# Patient Record
Sex: FEMALE | Race: BLACK OR AFRICAN AMERICAN | Employment: UNEMPLOYED | URBAN - METROPOLITAN AREA
[De-identification: names, ages, dates, MRNs, and addresses within clinical notes are randomized per-mention and may not be internally consistent; named-entity substitution may affect disease eponyms.]

---
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
## 2021-08-18 NOTE — ED PROVIDER NOTES
Patient Seen in: [facility] Emergency Department    History   Patient presents with:  Headache  Sore Throat      HPI    Patient presents to the ED complaining of headache and sore throat for 2 days. No fever.   Symptoms moderate in severity and co Head: Normocephalic and atraumatic. Right Ear: Tympanic membrane normal.      Left Ear: Tympanic membrane normal.      Nose: No congestion. Mouth/Throat:      Mouth: Mucous membranes are moist. No oral lesions.       Pharynx: Posterior oropharynge URI    Medical Record Review: I personally reviewed available prior medical records for any recent pertinent discharge summaries, testing, and procedures and reviewed those reports. Complicating Factors:  The patient already has does not have a problem l

## 2024-06-12 ENCOUNTER — NURSE ONLY (OUTPATIENT)
Dept: INTERNAL MEDICINE CLINIC | Facility: HOSPITAL | Age: 34
End: 2024-06-12
Attending: PREVENTIVE MEDICINE

## 2024-06-12 DIAGNOSIS — Z00.00 WELLNESS EXAMINATION: Primary | ICD-10-CM

## 2024-06-12 PROCEDURE — 86480 TB TEST CELL IMMUN MEASURE: CPT

## 2024-06-12 PROCEDURE — 86735 MUMPS ANTIBODY: CPT

## 2024-06-12 PROCEDURE — 86765 RUBEOLA ANTIBODY: CPT

## 2024-06-12 PROCEDURE — 86787 VARICELLA-ZOSTER ANTIBODY: CPT

## 2024-06-13 LAB
M TB IFN-G CD4+ T-CELLS BLD-ACNC: 0.06 IU/ML
M TB TUBERC IFN-G BLD QL: NEGATIVE
M TB TUBERC IGNF/MITOGEN IGNF CONTROL: >10 IU/ML
QFT TB1 AG MINUS NIL: 0.03 IU/ML
QFT TB2 AG MINUS NIL: 0.05 IU/ML

## 2024-06-14 LAB
MEV IGG SER-ACNC: 236 AU/ML (ref 16.5–?)
MUV IGG SER IA-ACNC: 82.8 AU/ML (ref 11–?)
VZV IGG SER IA-ACNC: 967.5 (ref 165–?)

## 2025-06-09 ENCOUNTER — HOSPITAL ENCOUNTER (EMERGENCY)
Facility: HOSPITAL | Age: 35
Discharge: HOME | End: 2025-06-09
Attending: EMERGENCY MEDICINE
Payer: MEDICAID

## 2025-06-09 ENCOUNTER — APPOINTMENT (OUTPATIENT)
Dept: ULTRASOUND IMAGING | Facility: HOSPITAL | Age: 35
End: 2025-06-09
Attending: EMERGENCY MEDICINE
Payer: MEDICAID

## 2025-06-09 VITALS
SYSTOLIC BLOOD PRESSURE: 115 MMHG | HEART RATE: 80 BPM | RESPIRATION RATE: 18 BRPM | WEIGHT: 180 LBS | DIASTOLIC BLOOD PRESSURE: 64 MMHG | BODY MASS INDEX: 31 KG/M2 | OXYGEN SATURATION: 97 % | TEMPERATURE: 98 F

## 2025-06-09 DIAGNOSIS — O20.0 THREATENED MISCARRIAGE (HCC): Primary | ICD-10-CM

## 2025-06-09 LAB
B-HCG SERPL-ACNC: 38.5 MIU/ML (ref ?–4.2)
BASOPHILS # BLD AUTO: 0.01 X10(3) UL (ref 0–0.2)
BASOPHILS NFR BLD AUTO: 0.2 %
DEPRECATED RDW RBC AUTO: 39.5 FL (ref 35.1–46.3)
EOSINOPHIL # BLD AUTO: 0.03 X10(3) UL (ref 0–0.7)
EOSINOPHIL NFR BLD AUTO: 0.7 %
ERYTHROCYTE [DISTWIDTH] IN BLOOD BY AUTOMATED COUNT: 12.3 % (ref 11–15)
HCT VFR BLD AUTO: 35.6 % (ref 35–48)
HGB BLD-MCNC: 12.2 G/DL (ref 12–16)
IMM GRANULOCYTES # BLD AUTO: 0.01 X10(3) UL (ref 0–1)
IMM GRANULOCYTES NFR BLD: 0.2 %
LYMPHOCYTES # BLD AUTO: 2.18 X10(3) UL (ref 1–4)
LYMPHOCYTES NFR BLD AUTO: 47.8 %
MCH RBC QN AUTO: 29.9 PG (ref 26–34)
MCHC RBC AUTO-ENTMCNC: 34.3 G/DL (ref 31–37)
MCV RBC AUTO: 87.3 FL (ref 80–100)
MONOCYTES # BLD AUTO: 0.36 X10(3) UL (ref 0.1–1)
MONOCYTES NFR BLD AUTO: 7.9 %
NEUTROPHILS # BLD AUTO: 1.97 X10 (3) UL (ref 1.5–7.7)
NEUTROPHILS # BLD AUTO: 1.97 X10(3) UL (ref 1.5–7.7)
NEUTROPHILS NFR BLD AUTO: 43.2 %
PLATELET # BLD AUTO: 352 10(3)UL (ref 150–450)
RBC # BLD AUTO: 4.08 X10(6)UL (ref 3.8–5.3)
RH BLOOD TYPE: POSITIVE
WBC # BLD AUTO: 4.6 X10(3) UL (ref 4–11)

## 2025-06-09 PROCEDURE — 84702 CHORIONIC GONADOTROPIN TEST: CPT | Performed by: EMERGENCY MEDICINE

## 2025-06-09 PROCEDURE — 76801 OB US < 14 WKS SINGLE FETUS: CPT | Performed by: EMERGENCY MEDICINE

## 2025-06-09 PROCEDURE — 85025 COMPLETE CBC W/AUTO DIFF WBC: CPT | Performed by: EMERGENCY MEDICINE

## 2025-06-09 PROCEDURE — 99284 EMERGENCY DEPT VISIT MOD MDM: CPT

## 2025-06-09 PROCEDURE — 86900 BLOOD TYPING SEROLOGIC ABO: CPT | Performed by: EMERGENCY MEDICINE

## 2025-06-09 PROCEDURE — 86901 BLOOD TYPING SEROLOGIC RH(D): CPT | Performed by: EMERGENCY MEDICINE

## 2025-06-09 PROCEDURE — 36415 COLL VENOUS BLD VENIPUNCTURE: CPT

## 2025-06-09 PROCEDURE — 76817 TRANSVAGINAL US OBSTETRIC: CPT | Performed by: EMERGENCY MEDICINE

## 2025-06-09 NOTE — ED QUICK NOTES
US results back. Pt states pain from US testing. 5/10. Pad provided, warm blankets provided. Awaiting provider to room to discuss results. Pt remains stable with partner to room. Care ongoing,,

## 2025-06-09 NOTE — ED INITIAL ASSESSMENT (HPI)
Patient had a positive preganancy test on Friday, spotting and mild cramping noted on Saturday, increasing bleeding and worsening cramping today.

## 2025-06-10 NOTE — ED PROVIDER NOTES
Patient Seen in: [facility] Emergency Department    History     Chief Complaint   Patient presents with    Pregnancy Issues     Stated Complaint: 1st trimester bleed    HPI    Patient is  ? Few weeks pregnant complains of vaginal bleeding that began 2 days ago  Patient states that this is similar to  amount of bleeding when compared to her typical menstrual bleeding.  Patient denies localizing abdominal pain.  not dizzy,not light headed, no shortness of breath.  no fever, or chills, no urinary frequency or urgency.  + nausea or vomiting.   no care prior to today.     Past Medical History[1]    Past Surgical History[2]         Family History[3]    Short Social Hx on File[4]    Review of Systems    Positive for stated complaint: 1st trimester bleed  Other systems are as noted in HPI.  Constitutional and vital signs reviewed.      All other systems reviewed and negative except as noted above.    PSFH elements reviewed from today and agreed except as otherwise stated in HPI.    Physical Exam     ED Triage Vitals [06/09/25 1415]   /82   Pulse 71   Resp 18   Temp 97.6 °F (36.4 °C)   Temp src Temporal   SpO2 99 %   O2 Device None (Room air)       Current:/64   Pulse 80   Temp 98.4 °F (36.9 °C) (Oral)   Resp 18   Wt 81.6 kg   LMP 04/28/2025 (Approximate)   SpO2 97%   BMI 30.90 kg/m²     PULSE OX nl  GENERAL awake alert   HEAD: normocephalic, atraumatic  EYES: PERRLA, EOMI,  THROAT: mm dry, no lesions  NECK: supple, no meningeal signs  LUNGS: no resp distress, cta bilateral  CARDIO: RRR without murmur  GI: soft mild suprapubic tenderness    EXTREMITIES: moves all 4 spont, no edema  NEURO: alert, oriented x 3, 2-12 intact, no focal deficits appreciated  SKIN: good skin turgor, no  rashes  PSYCH: calm, cooperative,    Differential includes:  ectopic pregnancy vs. subchorionic hemorrhage vs. miscariage       ED Course     Labs Reviewed   HCG, BETA SUBUNIT (QUANT PREGNANCY TEST) - Abnormal; Notable  for the following components:       Result Value    Hcg Quantitative 38.5 (*)     All other components within normal limits   CBC WITH DIFFERENTIAL WITH PLATELET   ABORH (BLOOD TYPE)       MDM     Monitor Interpretation:      Radiology Interpretation:  US PREG 1ST TRIM W/EV (CPT=76801/24389)  Result Date: 2025  CONCLUSION:  1.  At this time a gestational sac is not identified.  Differential for this finding includes early dates, missed , or ectopic pregnancy.  Endometrium is heterogeneous and thickened up to 12 mm, suggesting presence of blood products within the canal, and this finding favors missed  however recommend follow-up to include comparison beta hCG level and ultrasound in 3 days. 2.  Left ovarian corpus luteum.     Dictated by (CST): Kali Anglin MD on 2025 at 5:29 PM     Finalized by (CST): Kali Anglin MD on 2025 at 5:33 PM            Medical Decision Making  Suspect miscarriage instruct patient to have repeat quant in 2 days return if worsening symptoms.    Problems Addressed:  Threatened miscarriage (HCC): acute illness or injury    Amount and/or Complexity of Data Reviewed  External Data Reviewed: radiology.  Discussion of management or test interpretation with external provider(s): Stress to return if worse, needs repeat blood in 2-3 days.          Disposition and Plan     Clinical Impression:  1. Threatened miscarriage (HCC)        Disposition:  Discharge    Follow-up:  Pamella Ramos DO  [address] [address]  [phone]    Follow up        Medications Prescribed:  Discharge Medication List as of 2025  6:19 PM                         [1] History reviewed. No pertinent past medical history.  [2]   Past Surgical History:  Procedure Laterality Date    Repair ing hernia,7+y/o,reducibl     [3] No family history on file.  [4]   Social History  Socioeconomic History    Marital status: Single   Tobacco Use    Smoking status: Never     Social  Drivers of Health     Food Insecurity: No Food Insecurity (10/24/2024)    Received from [facility]    Food Insecurity     Currently or in the past 3 months, have you worried your food would run out before you had money to buy more?: No     In the past 12 months, have you run out of food or been unable to get more?: No   Transportation Needs: No Transportation Needs (10/24/2024)    Received from [facility]    Transportation Needs     Currently or in the past 3 months, has lack of transportation kept you from medical appointments, getting food or medicine, or providing care to a family member?: No     Medical Transportation Needs?: No    Received from [facility]    Housing Stability

## 2025-06-12 ENCOUNTER — HOSPITAL ENCOUNTER (EMERGENCY)
Facility: HOSPITAL | Age: 35
Discharge: HOME | End: 2025-06-12
Attending: EMERGENCY MEDICINE
Payer: MEDICAID

## 2025-06-12 VITALS
SYSTOLIC BLOOD PRESSURE: 141 MMHG | HEART RATE: 82 BPM | DIASTOLIC BLOOD PRESSURE: 77 MMHG | OXYGEN SATURATION: 100 % | TEMPERATURE: 98 F | RESPIRATION RATE: 16 BRPM | WEIGHT: 183 LBS | BODY MASS INDEX: 31.24 KG/M2 | HEIGHT: 64 IN

## 2025-06-12 DIAGNOSIS — O03.9 SPONTANEOUS MISCARRIAGE (HCC): Primary | ICD-10-CM

## 2025-06-12 LAB — B-HCG SERPL-ACNC: 13.2 MIU/ML (ref ?–4.2)

## 2025-06-12 PROCEDURE — 99283 EMERGENCY DEPT VISIT LOW MDM: CPT

## 2025-06-12 PROCEDURE — 36415 COLL VENOUS BLD VENIPUNCTURE: CPT

## 2025-06-12 PROCEDURE — 84702 CHORIONIC GONADOTROPIN TEST: CPT | Performed by: EMERGENCY MEDICINE

## 2025-06-12 NOTE — ED INITIAL ASSESSMENT (HPI)
Pt here last Monday experiencing vaginal bleeding and pain.+ pregnancy, pt unsure how many weeks. Pt claimed Dr Ritter said to follow up if experiencing similar symptoms. Pt c/o of lightheadedness, nausea, and vaginal pain/bleeding. Denies any vomiting.

## 2025-06-13 NOTE — ED PROVIDER NOTES
Patient Seen in: [facility] Emergency Department    History     Chief Complaint   Patient presents with    Eval-G       HPI    Patient presents to the ED complaining of vaginal bleeding and some cramping starting several days ago.  She was seen several days ago in the ED and diagnosed with a possible spontaneous miscarriage.  She could not follow-up with OB/GYN until next week so came back to the ER today for reevaluation.  She states bleeding is less.    History reviewed. Past Medical History[1]    History reviewed. Past Surgical History[2]      Medications :  Prescriptions Prior to Admission[3]     Family History[4]    Smoking Status: Social Hx on file[5]    Constitutional and vital signs reviewed.      Social History and Family History elements reviewed from today, pertinent positives to the presenting problem noted.    Physical Exam     ED Triage Vitals [06/12/25 1506]   /77   Pulse 77   Resp 18   Temp 98 °F (36.7 °C)   Temp src Oral   SpO2 98 %   O2 Device None (Room air)       All measures to prevent infection transmission during my interaction with the patient were taken. Handwashing was performed prior to and after the exam.  Stethoscope and any equipment used during my examination was cleaned with super sani-cloth germicidal wipes following the exam.     Physical Exam  Constitutional:       General: She is not in acute distress.     Appearance: Normal appearance. She is not ill-appearing or toxic-appearing.   Pulmonary:      Effort: Pulmonary effort is normal. No respiratory distress.   Neurological:      Mental Status: She is alert. Mental status is at baseline.   Psychiatric:         Mood and Affect: Mood normal.         Behavior: Behavior normal.         ED Course        Labs Reviewed   HCG, BETA SUBUNIT (QUANT PREGNANCY TEST) - Abnormal; Notable for the following components:       Result Value    Hcg Quantitative 13.2 (*)     All other components within normal limits       As Interpreted by  me    Imaging Results Available and Reviewed while in ED: No results found.  ED Medications Administered: Medications - No data to display      MDM     Vitals:    06/12/25 1506 06/12/25 1545 06/12/25 1630 06/12/25 1645   BP: 141/77      Pulse: 77 86 80 82   Resp: 18 16 16 16   Temp: 98 °F (36.7 °C)      TempSrc: Oral      SpO2: 98% 100% 99% 100%   Weight: 83 kg      Height: 162.6 cm (5' 4\")        *I personally reviewed and interpreted all ED vitals.    Pulse Ox: 100%, Room air, Normal       Differential Diagnosis/ Diagnostic Considerations: Vaginal bleeding in early pregnancy, ectopic pregnancy, spontaneous miscarriage, other    Complicating Factors: The patient already has does not have a problem list on file. to contribute to the complexity of this ED evaluation.    Medical Decision Making  Patient returns in the ED for reevaluation.  Bleeding has improved.  Nondistressed on exam.  Beta-hCG levels now significantly lower, 13 versus 38 two days ago.  Suspect spontaneous miscarriage.  Patient advised on follow-up with OB/GYN next week as scheduled and return precautions.    Problems Addressed:  Spontaneous miscarriage (HCC): acute illness or injury    Amount and/or Complexity of Data Reviewed  Labs: ordered. Decision-making details documented in ED Course.        Condition upon leaving the department: Stable    Disposition and Plan     Clinical Impression:  1. Spontaneous miscarriage (HCC)        Disposition:  Discharge    Follow-up:  Aziza Engel  [facility] [address] [address]  [phone]    Schedule an appointment as soon as possible for a visit in 3 day(s)        Medications Prescribed:  Discharge Medication List as of 6/12/2025  4:59 PM                           [1] No past medical history on file.  [2]   Past Surgical History:  Procedure Laterality Date    Repair ing hernia,7+y/o,reducibl     [3] (Not in a hospital admission)  [4] No family history on file.  [5]   Social  History  Socioeconomic History    Marital status: Single   Tobacco Use    Smoking status: Never   Substance and Sexual Activity    Alcohol use: Not Currently    Drug use: Never

## (undated) NOTE — ED AVS SNAPSHOT
Mirian Dorado   MRN: [id number]    Department:  [facility] Emergency Department   Date of Visit:  1/25/2020           Disclosure     Insurance plans vary and the physician(s) referred by the ER may not be covered by your plan.  Please contact CARE PHYSICIAN AT ONCE OR RETURN IMMEDIATELY TO THE EMERGENCY DEPARTMENT. If you have been prescribed any medication(s), please fill your prescription right away and begin taking the medication(s) as directed.   If you believe that any of the medications